# Patient Record
Sex: FEMALE
[De-identification: names, ages, dates, MRNs, and addresses within clinical notes are randomized per-mention and may not be internally consistent; named-entity substitution may affect disease eponyms.]

---

## 2020-02-25 ENCOUNTER — APPOINTMENT (OUTPATIENT)
Dept: OTOLARYNGOLOGY | Facility: CLINIC | Age: 45
End: 2020-02-25
Payer: COMMERCIAL

## 2020-02-25 DIAGNOSIS — E03.9 HYPOTHYROIDISM, UNSPECIFIED: ICD-10-CM

## 2020-02-25 DIAGNOSIS — Z80.9 FAMILY HISTORY OF MALIGNANT NEOPLASM, UNSPECIFIED: ICD-10-CM

## 2020-02-25 DIAGNOSIS — K21.9 GASTRO-ESOPHAGEAL REFLUX DISEASE W/OUT ESOPHAGITIS: ICD-10-CM

## 2020-02-25 DIAGNOSIS — Z78.9 OTHER SPECIFIED HEALTH STATUS: ICD-10-CM

## 2020-02-25 DIAGNOSIS — R43.2 PARAGEUSIA: ICD-10-CM

## 2020-02-25 PROBLEM — Z00.00 ENCOUNTER FOR PREVENTIVE HEALTH EXAMINATION: Status: ACTIVE | Noted: 2020-02-25

## 2020-02-25 PROCEDURE — 31575 DIAGNOSTIC LARYNGOSCOPY: CPT

## 2020-02-25 PROCEDURE — 99203 OFFICE O/P NEW LOW 30 MIN: CPT | Mod: 25

## 2020-02-25 RX ORDER — LEVOTHYROXINE SODIUM 0.17 MG/1
TABLET ORAL
Refills: 0 | Status: ACTIVE | COMMUNITY

## 2020-02-25 RX ORDER — NORGESTREL AND ETHINYL ESTRADIOL 0.3-0.03MG
KIT ORAL
Refills: 0 | Status: ACTIVE | COMMUNITY

## 2020-02-25 NOTE — ASSESSMENT
[FreeTextEntry1] : She has idiopathic dysgeusia.\par On the rectum and a CT scan of her sinuses with attention to her skull base and olfactory groove.\par Pending the results she may need a serologic and/or neurologic evaluation.

## 2020-02-25 NOTE — HISTORY OF PRESENT ILLNESS
[de-identified] : This is an initial visit for this woman whose chief complaint is "taste-metallic and bad taste".\par She reports that this has been present for at least likely longer than 6 months.\par Develop spontaneously.\par She reports that during her 2 pregnancies she had a similar problem but that problem resolved.\par She does not have a history of head trauma or chemical exposure.\par \par She does report a history of reflux. She reports that she's had a barium swallow in the past and GI endoscopy and told that she had reflux. She took treatment at one point but did not feel that it helps with symptoms to include symptoms of this bad taste.\par \par She reports that when she wakes up in the morning she frequently has a normal sense of taste but as the day progresses she develops an abnormal taste. She feels that most foods especially citrus and sugar makes the problem worse.\par \par She has not began taking any new medication.\par She does not have a history of sinus or nasal disease.

## 2020-02-25 NOTE — PROCEDURE
[de-identified] : PROCEDURE: Flexible laryngoscopy\par SURGEON: Dr. Cazares\par INDICATIONS: He was unable to tolerate a mirror exam. Assess for laryngopharynx pharyngeal reflux. cough. head and neck mass. \par ANESTHESIA: The patient was placed in a sitting position.  Following application of the topical anesthetic and decongestant, exam was performed with a flexible scope.  The scope was passed along the right nasal floor to the nasopharynx.  It was then passed into the region of the middle meatus, middle turbinate, and sphenoethmoid region.  An identical procedure was performed on the left side.  The following findings were noted:\par \par The nasal musoca was healthy appearing and the septum was roughly midline. The middle meatus and sphenoethmoid recesses were clear bilaterally. The nasopharynx \par \par Nasopharynx: no masses, choanae patent, no adenoid tissue\par \par Base of tongue/vallecula: no masses or asymmetry\par Pharyngeal walls: symmetrical. No masses.\par Pyriform sinuses: no lesions or pooling of secretions.\par Epiglottis: normal. No edema or lesions.\par Aryepiglottic folds: normal. No lesions. \par Vocal cords: clear and mobile. No lesions. Airway patent.\par Arytenoids: no edema or erythema. \par Interarytenoid area: no edema, erythema or lesion.\par

## 2022-01-01 NOTE — REASON FOR VISIT
[FreeTextEntry1] : 6oz in 6 days = good weight gain \par follow up weight check monday \par advised to use baby oil for dry skin \par advised saline / humidifier can be used for whistle sound when breathing \par advised to keep pt upright after feeding \par \par  [FreeTextEntry2] : taste pathology. [Initial Evaluation] : an initial evaluation for